# Patient Record
(demographics unavailable — no encounter records)

---

## 2024-10-29 NOTE — HISTORY OF PRESENT ILLNESS
[de-identified] : Mr. KAUFMAN is a 41 year y/o M presents for consultation visit, for mass of right chest wall.  Mass in the right lateral chest wall about 5 cm in size. Tender at times and increasing in size

## 2024-10-29 NOTE — HISTORY OF PRESENT ILLNESS
[de-identified] : Mr. KAUFMAN is a 41 year y/o M presents for consultation visit, for mass of right chest wall.  Mass in the right lateral chest wall about 5 cm in size. Tender at times and increasing in size

## 2024-10-29 NOTE — PLAN
[FreeTextEntry1] : Mr. GREGG KAUFMAN  was informed of significance of findings. All options, risks and benefits were discussed at length. Informed consent for excision of, R CHEST WALL MASS;  and potential risks, benefits and alternatives (surgical options were discussed including non-surgical options or the option of no surgery) to the planned surgery were discussed in depth. All surgical options were discussed including non-surgical treatments. The patient wishes to proceed with surgery. We will plan for surgery on at the next available date, pending any required insurance pre-certification or pre-approval. He agrees to obtain any necessary pre-operative evaluations and testing prior to surgery. Patient advised to seek immediate medical attention with any acute change in symptoms or with the development of any new or worsening symptoms. Patient's questions and concerns addressed to patient's satisfaction, and patient verbalized an understanding of the information discussed.

## 2024-10-29 NOTE — PHYSICAL EXAM
[TextEntry] : Physical Exam:   Constitutional - well-developed; well-nourished; no distress;   Eyes - EOMI; PERRL; no drainage or redness	  ENT -	no gross abnormalities  Neck Details	supple; no JVD	  Respiratory Details -  good air movement, airway patent   Cardiovascular  - regular rate and rhythm  Extremities - no clubbing; no cyanosis; pedal edema, no clubbing  Neurological	- Neurological Details	alert and oriented x 3	  Skin - No lesions; no rash  Musculoskeletal - no joint swelling; no joint erythema; no joint warmth  Lymph Nodes - No lymphadenopathy  Gastrointestinal - soft; no distention	 5 cm mass intramuscular right chest wall

## 2024-10-29 NOTE — HISTORY OF PRESENT ILLNESS
[de-identified] : Mr. KAUFMAN is a 41 year y/o M presents for consultation visit, for mass of right chest wall.  Mass in the right lateral chest wall about 5 cm in size. Tender at times and increasing in size

## 2024-10-29 NOTE — HISTORY OF PRESENT ILLNESS
[de-identified] : Mr. KAUFMAN is a 41 year y/o M presents for consultation visit, for mass of right chest wall.  Mass in the right lateral chest wall about 5 cm in size. Tender at times and increasing in size

## 2024-10-29 NOTE — PRE-SURGICAL ASSESSMENT
[PST Chart Review] : PST Chart Review (low patient risk, very low procedural risk) [Run a short distance?] : run a short distance [Walk indoors around the house?] : walk indoors around the house [Do heavy work around the house like scrubbing] : do heavy work around the house like scrubbing floors or lifting or moving heavy furniture [Participate in strenuous sports like swimming,] : participate in strenuous sports like swimming, singles tennis, football, basketball, or skiing [Climb two flights of stairs or walk up a hill?] : climb two flights of stairs or walk up a hill [Participate in moderate recreational activities] : participate in moderate recreational activities like golf, bowling, or dancing [Take care of yourself?] : take care of themselves [Eat, dress, or use the toilet?] : eat, dress, or use the toilet [Do light work around the house like] : do light work around the house like dusting or washing dishes [Walk 100 meters on level ground] : walk 100 meters on level ground at 3 to 5 km per hour [30 days] : 30 days [unknown] : unknown [G: GENDER (birth sex) = Male] : birth sex is male [0 - 2: Low Risk] : 0 - 2: Low Risk [No] : no [Labs] : labs [not applicable] : not applicable [FreeTextEntry1] : excision of right chest wall mass  [S: Do you SNORE loudly] : does not snore loudly [T: Are you TIRED, fatigued, or sleepy during the daytime] : not tired, fatigued, or sleepy during the daytime [O: Has anyone OBSERVED you stop breathing during your sleep] : not observed to have stopped during sleep [P: Do you have, or are you being treated for, high blood PRESSURE] : no hypertension, not treated for high blood pressure [B: BMI greater than 35 kG/m2] : BMI less than 35 kG/m2 [A: AGE over 50 years old] : not over 50 years of age [N: NECK circumference greater than 16 inches] : neck circumference less than 16 inches [Devices being used in treatment (i.e., pacemaker, defib, loop recorder, stimulator, pain pump, other devices/implantables)] : No devices in use in treatment

## 2024-10-29 NOTE — HISTORY OF PRESENT ILLNESS
[de-identified] : Mr. KAUFMAN is a 41 year y/o M presents for consultation visit, for mass of right chest wall.  Mass in the right lateral chest wall about 5 cm in size. Tender at times and increasing in size

## 2024-11-29 NOTE — PRE-SURGICAL ASSESSMENT
[PST Chart Review] : PST Chart Review (low patient risk, very low procedural risk) [Run a short distance?] : run a short distance [Walk indoors around the house?] : walk indoors around the house [Do heavy work around the house like scrubbing] : do heavy work around the house like scrubbing floors or lifting or moving heavy furniture [Participate in strenuous sports like swimming,] : participate in strenuous sports like swimming, singles tennis, football, basketball, or skiing [Climb two flights of stairs or walk up a hill?] : climb two flights of stairs or walk up a hill [Participate in moderate recreational activities] : participate in moderate recreational activities like golf, bowling, or dancing [Take care of yourself?] : take care of themselves [Eat, dress, or use the toilet?] : eat, dress, or use the toilet [Do light work around the house like] : do light work around the house like dusting or washing dishes [Walk 100 meters on level ground] : walk 100 meters on level ground at 3 to 5 km per hour [FreeTextEntry1] : excision of right chest wall mass  [30 days] : 30 days [unknown] : unknown [S: Do you SNORE loudly] : does not snore loudly [T: Are you TIRED, fatigued, or sleepy during the daytime] : not tired, fatigued, or sleepy during the daytime [O: Has anyone OBSERVED you stop breathing during your sleep] : not observed to have stopped during sleep [P: Do you have, or are you being treated for, high blood PRESSURE] : no hypertension, not treated for high blood pressure [B: BMI greater than 35 kG/m2] : BMI less than 35 kG/m2 [A: AGE over 50 years old] : not over 50 years of age [N: NECK circumference greater than 16 inches] : neck circumference less than 16 inches [G: GENDER (birth sex) = Male] : birth sex is male [0 - 2: Low Risk] : 0 - 2: Low Risk [No] : no [Devices being used in treatment (i.e., pacemaker, defib, loop recorder, stimulator, pain pump, other devices/implantables)] : No devices in use in treatment [Labs] : labs [not applicable] : not applicable

## 2024-11-29 NOTE — HISTORY OF PRESENT ILLNESS
[de-identified] : Mr. KAUFMAN is a 41 year y/o M presents for consultation visit, for mass of right chest wall.  Mass in the right lateral chest wall about 5 cm in size. Tender at times and increasing in size